# Patient Record
Sex: MALE | Race: BLACK OR AFRICAN AMERICAN
[De-identification: names, ages, dates, MRNs, and addresses within clinical notes are randomized per-mention and may not be internally consistent; named-entity substitution may affect disease eponyms.]

---

## 2018-01-09 ENCOUNTER — HOSPITAL ENCOUNTER (EMERGENCY)
Dept: HOSPITAL 89 - ER | Age: 33
Discharge: HOME | End: 2018-01-09
Payer: COMMERCIAL

## 2018-01-09 VITALS — BODY MASS INDEX: 38.3 KG/M2 | HEIGHT: 67 IN | WEIGHT: 244 LBS

## 2018-01-09 VITALS — SYSTOLIC BLOOD PRESSURE: 132 MMHG | DIASTOLIC BLOOD PRESSURE: 72 MMHG

## 2018-01-09 DIAGNOSIS — K52.9: Primary | ICD-10-CM

## 2018-01-09 LAB — PLATELET COUNT, AUTOMATED: 294 K/UL (ref 150–450)

## 2018-01-09 PROCEDURE — 96361 HYDRATE IV INFUSION ADD-ON: CPT

## 2018-01-09 PROCEDURE — 99284 EMERGENCY DEPT VISIT MOD MDM: CPT

## 2018-01-09 PROCEDURE — 82310 ASSAY OF CALCIUM: CPT

## 2018-01-09 PROCEDURE — 84075 ASSAY ALKALINE PHOSPHATASE: CPT

## 2018-01-09 PROCEDURE — 84295 ASSAY OF SERUM SODIUM: CPT

## 2018-01-09 PROCEDURE — 84155 ASSAY OF PROTEIN SERUM: CPT

## 2018-01-09 PROCEDURE — 87502 INFLUENZA DNA AMP PROBE: CPT

## 2018-01-09 PROCEDURE — 82565 ASSAY OF CREATININE: CPT

## 2018-01-09 PROCEDURE — 82435 ASSAY OF BLOOD CHLORIDE: CPT

## 2018-01-09 PROCEDURE — 96375 TX/PRO/DX INJ NEW DRUG ADDON: CPT

## 2018-01-09 PROCEDURE — 82947 ASSAY GLUCOSE BLOOD QUANT: CPT

## 2018-01-09 PROCEDURE — 82040 ASSAY OF SERUM ALBUMIN: CPT

## 2018-01-09 PROCEDURE — 81001 URINALYSIS AUTO W/SCOPE: CPT

## 2018-01-09 PROCEDURE — 82247 BILIRUBIN TOTAL: CPT

## 2018-01-09 PROCEDURE — 82374 ASSAY BLOOD CARBON DIOXIDE: CPT

## 2018-01-09 PROCEDURE — 84520 ASSAY OF UREA NITROGEN: CPT

## 2018-01-09 PROCEDURE — 84450 TRANSFERASE (AST) (SGOT): CPT

## 2018-01-09 PROCEDURE — 84460 ALANINE AMINO (ALT) (SGPT): CPT

## 2018-01-09 PROCEDURE — 85025 COMPLETE CBC W/AUTO DIFF WBC: CPT

## 2018-01-09 PROCEDURE — 96374 THER/PROPH/DIAG INJ IV PUSH: CPT

## 2018-01-09 PROCEDURE — 84132 ASSAY OF SERUM POTASSIUM: CPT

## 2018-01-09 NOTE — ER REPORT
History and Physical


Time Seen By MD:  19:04


Hx. of Stated Complaint:  


PATIENT HAS BEEN HAVING ABDOMINAL CRAMPING, DIARRHEA AND VOMITING SINCE 


YESTERDAY. PATIENT STATES HE FEELS LIKE HE IS "CRAPPING RAZOR BLADES". THERE IS 


BLOOD IN STOOL AND IT IS MUCOIDISH.


HPI/ROS


CHIEF COMPLAINT: Diarrhea





HISTORY OF PRESENT ILLNESS: This is a 32-year-old male who presents to the 

emergency department for diarrhea. Patient states that yesterday morning when 

he left [place] he developed some abdominal pain that progressed into diarrhea 

yesterday and even worse today he's had multiple episodes of diarrhea now with 

blood and discomfort in his rectum. Patient states that he has a jellylike 

discharge in the stool, no foul odor. Patient states that he's had bright red 

blood on the toilet paper with darker blood in the stool. No clots. He's had 

aches, chills, has felt feverish but no documented fevers. One episode of 

emesis with nausea. Patient denies urinary discomfort.





REVIEW OF SYSTEMS:


Constitutional: As above.


Eyes: No discharge.


ENT: No sore throat. 


Cardiovascular: No chest pain, no palpitations.


Respiratory: No cough, no shortness of breath.


Gastrointestinal: As above.


Genitourinary: No hematuria.


Musculoskeletal: No back pain.


Skin: No rashes.


Neurological: No headache.


Allergies:  


Coded Allergies:  


     morphine (Verified  Allergy, Intermediate, ITCHING/SKIN BURNING, 1/9/18)


Home Meds


Active Scripts


Tramadol Hcl (TRAMADOL HCL) 50 Mg Tablet,  MG PO Q4-6H, #10 TAB


   Prov:BEULAH ROBERSON FNP-BC         1/9/18


Reported Medications


Sertraline Hcl (ZOLOFT) 50 Mg Tablet, 1 TAB PO QDAY, TAB


   1/9/18


Divalproex Sodium (DEPAKOTE) 250 Mg Tablet.dr, 250 MG PO BID, TAB


   1/9/18


Past Medical/Surgical History


Patient has a past medical and surgical history of seizures, migraines, 

hypertension, hypercholesterolemia, glasses, bipolar, depression, lumbar fusion 

with titanium rods


Reviewed Nurses Notes:  Yes


Hx Substance Use Disorder:  No


Hx Alcohol Use:  Yes (OCCASSIONAL)


Constitutional





Vital Sign - Last 24 Hours








 1/9/18 1/9/18 1/9/18 1/9/18





 18:55 19:00 19:03 19:18


 


Temp 98.2   


 


Pulse 98  94 98


 


Resp 24   


 


B/P (MAP) 154/92 152/87 (108)  


 


Pulse Ox 92  92 95


 


O2 Delivery Room Air   


 


    





 1/9/18 1/9/18 1/9/18 1/9/18





 19:30 19:33 19:48 20:00


 


Pulse  94 88 


 


B/P (MAP) 135/80 (98)   135/74 (94)


 


Pulse Ox  94 92 





 1/9/18 1/9/18 1/9/18 1/9/18





 20:03 20:18 20:23 20:30


 


Pulse 89 92 90 


 


B/P (MAP)    132/72 (92)


 


Pulse Ox 93 91 91 





 1/9/18 1/9/18  





 20:38 20:43  


 


Pulse 89 96  


 


Pulse Ox 92 91  














Intake and Output   


 


 1/9/18 1/9/18 1/10/18





 15:00 23:00 07:00


 


Intake Total  1000 ml 


 


Balance  1000 ml 








Physical Exam


   General Appearance: The patient is alert, has no immediate need for airway 

protection and no signs of toxicity. 


Eyes: Pupils equal and round no pallor or injection.


ENT, Mouth: Mucous membranes are moist. Mild erythema to the posterior 

oropharynx, no tonsillar hypertrophy.


Respiratory: There are no retractions, lungs are clear to auscultation.


Cardiovascular: Regular rate and rhythm, no murmurs, clicks or rubs.


Gastrointestinal:  Abdomen is soft with tenderness to bilateral lower quadrants 

as well as left upper with palpation, no masses, hyperactive bowel sounds.


Neurological: Alert and oriented 4. Moving all extremity. No focal neuro 

deficits. Following all commands.


Skin: Warm and dry, no rashes.


Musculoskeletal:  Neck is supple non tender.


      Extremities are nontender, nonswollen and have full range of motion.





DIFFERENTIAL DIAGNOSIS: After history and physical exam differential diagnosis 

was considered for abdominal pain including but not limited to appendicitis, 

cholecystitis, gastritis and urinary tract infection.





Medical Decision Making


Data Points


Result Diagram:  


1/9/18 1921 1/9/18 1921





Laboratory





Hematology








Test


  1/9/18


19:21


 


Red Blood Count


  5.61 M/uL


(4.00-5.60)


 


Mean Corpuscular Volume


  89.4 fL


(80.0-96.0)


 


Mean Corpuscular Hemoglobin


  30.0 pg


(26.0-33.0)


 


Mean Corpuscular Hemoglobin


Concent 33.5 g/dL


(32.0-36.0)


 


Red Cell Distribution Width


  13.4 %


(11.5-14.5)


 


Mean Platelet Volume


  9.0 fL


(7.2-11.1)


 


Neutrophils (%) (Auto)


  63.3 %


(39.4-72.5)


 


Lymphocytes (%) (Auto)


  28.0 %


(17.6-49.6)


 


Monocytes (%) (Auto)


  4.4 %


(4.1-12.4)


 


Eosinophils (%) (Auto)


  3.3 %


(0.4-6.7)


 


Basophils (%) (Auto)


  1.0 %


(0.3-1.4)


 


Nucleated RBC Relative Count


(auto) 0.1 /100WBC 


 


 


Neutrophils # (Auto)


  7.8 K/uL


(2.0-7.4)


 


Lymphocytes # (Auto)


  3.4 K/uL


(1.3-3.6)


 


Monocytes # (Auto)


  0.5 K/uL


(0.3-1.0)


 


Eosinophils # (Auto)


  0.4 K/uL


(0.0-0.5)


 


Basophils # (Auto)


  0.1 K/uL


(0.0-0.1)


 


Nucleated RBC Absolute Count


(auto) 0.01 K/uL 


 


 


Urine Color Yellow 


 


Urine Clarity Clear 


 


Urine pH


  5.0 pH


(4.8-9.5)


 


Urine Specific Gravity 1.021 


 


Urine Protein


  Negative mg/dL


(NEGATIVE)


 


Urine Glucose (UA)


  Negative mg/dL


(NEGATIVE)


 


Urine Ketones


  Negative mg/dL


(NEGATIVE)


 


Urine Blood


  Small


(NEGATIVE)


 


Urine Nitrite


  Negative


(NEGATIVE)


 


Urine Bilirubin


  Negative


(NEGATIVE)


 


Urine Urobilinogen


  Negative mg/dL


(0.2-1.9)


 


Urine Leukocyte Esterase


  Negative


(NEGATIVE)


 


Urine RBC


  <1 /HPF


(0-2/HPF)


 


Urine WBC


  <1 /HPF


(0-5/HPF)


 


Urine Squamous Epithelial


Cells None /LPF


(</=FEW)


 


Urine Bacteria


  Negative /HPF


(NONE-FEW)


 


Urine Mucus


  None /HPF


(NONE-FEW)


 


Sodium Level


  138 mmol/L


(137-145)


 


Potassium Level


  3.8 mmol/L


(3.5-5.0)


 


Chloride Level


  101 mmol/L


()


 


Carbon Dioxide Level


  26 mmol/L


(22-30)


 


Blood Urea Nitrogen


  11 mg/dl


(9-21)


 


Creatinine


  0.90 mg/dl


(0.66-1.25)


 


Glomerular Filtration Rate


Calc > 60.0 


 


 


Random Glucose


  107 mg/dl


()


 


Calcium Level


  9.2 mg/dl


(8.4-10.2)


 


Total Bilirubin


  0.3 mg/dl


(0.2-1.3)


 


Aspartate Amino Transf


(AST/SGOT) 25 U/L (0-35) 


 


 


Alanine Aminotransferase


(ALT/SGPT) 35 U/L (0-56) 


 


 


Alkaline Phosphatase


  108 U/L


(0-126)


 


Total Protein


  7.9 gm/dl


(6.3-8.2)


 


Albumin


  4.3 g/dl


(3.5-5.0)


 


Influenza Type A Antigen


  Negative


(NEGATIVE)


 


Influenza Type B Antigen


  Negative


(NEGATIVE)








Chemistry








Test


  1/9/18


19:21


 


White Blood Count


  12.3 k/uL


(4.5-11.0)


 


Red Blood Count


  5.61 M/uL


(4.00-5.60)


 


Hemoglobin


  16.8 g/dL


(14.0-18.0)


 


Hematocrit


  50.1 %


(42.0-52.0)


 


Mean Corpuscular Volume


  89.4 fL


(80.0-96.0)


 


Mean Corpuscular Hemoglobin


  30.0 pg


(26.0-33.0)


 


Mean Corpuscular Hemoglobin


Concent 33.5 g/dL


(32.0-36.0)


 


Red Cell Distribution Width


  13.4 %


(11.5-14.5)


 


Platelet Count


  294 K/uL


(150-450)


 


Mean Platelet Volume


  9.0 fL


(7.2-11.1)


 


Neutrophils (%) (Auto)


  63.3 %


(39.4-72.5)


 


Lymphocytes (%) (Auto)


  28.0 %


(17.6-49.6)


 


Monocytes (%) (Auto)


  4.4 %


(4.1-12.4)


 


Eosinophils (%) (Auto)


  3.3 %


(0.4-6.7)


 


Basophils (%) (Auto)


  1.0 %


(0.3-1.4)


 


Nucleated RBC Relative Count


(auto) 0.1 /100WBC 


 


 


Neutrophils # (Auto)


  7.8 K/uL


(2.0-7.4)


 


Lymphocytes # (Auto)


  3.4 K/uL


(1.3-3.6)


 


Monocytes # (Auto)


  0.5 K/uL


(0.3-1.0)


 


Eosinophils # (Auto)


  0.4 K/uL


(0.0-0.5)


 


Basophils # (Auto)


  0.1 K/uL


(0.0-0.1)


 


Nucleated RBC Absolute Count


(auto) 0.01 K/uL 


 


 


Urine Color Yellow 


 


Urine Clarity Clear 


 


Urine pH


  5.0 pH


(4.8-9.5)


 


Urine Specific Gravity 1.021 


 


Urine Protein


  Negative mg/dL


(NEGATIVE)


 


Urine Glucose (UA)


  Negative mg/dL


(NEGATIVE)


 


Urine Ketones


  Negative mg/dL


(NEGATIVE)


 


Urine Blood


  Small


(NEGATIVE)


 


Urine Nitrite


  Negative


(NEGATIVE)


 


Urine Bilirubin


  Negative


(NEGATIVE)


 


Urine Urobilinogen


  Negative mg/dL


(0.2-1.9)


 


Urine Leukocyte Esterase


  Negative


(NEGATIVE)


 


Urine RBC


  <1 /HPF


(0-2/HPF)


 


Urine WBC


  <1 /HPF


(0-5/HPF)


 


Urine Squamous Epithelial


Cells None /LPF


(</=FEW)


 


Urine Bacteria


  Negative /HPF


(NONE-FEW)


 


Urine Mucus


  None /HPF


(NONE-FEW)


 


Glomerular Filtration Rate


Calc > 60.0 


 


 


Calcium Level


  9.2 mg/dl


(8.4-10.2)


 


Total Bilirubin


  0.3 mg/dl


(0.2-1.3)


 


Aspartate Amino Transf


(AST/SGOT) 25 U/L (0-35) 


 


 


Alanine Aminotransferase


(ALT/SGPT) 35 U/L (0-56) 


 


 


Alkaline Phosphatase


  108 U/L


(0-126)


 


Total Protein


  7.9 gm/dl


(6.3-8.2)


 


Albumin


  4.3 g/dl


(3.5-5.0)


 


Influenza Type A Antigen


  Negative


(NEGATIVE)


 


Influenza Type B Antigen


  Negative


(NEGATIVE)








Urinalysis








Test


  1/9/18


19:21


 


Urine Color Yellow 


 


Urine Clarity Clear 


 


Urine pH


  5.0 pH


(4.8-9.5)


 


Urine Specific Gravity 1.021 


 


Urine Protein


  Negative mg/dL


(NEGATIVE)


 


Urine Glucose (UA)


  Negative mg/dL


(NEGATIVE)


 


Urine Ketones


  Negative mg/dL


(NEGATIVE)


 


Urine Blood


  Small


(NEGATIVE)


 


Urine Nitrite


  Negative


(NEGATIVE)


 


Urine Bilirubin


  Negative


(NEGATIVE)


 


Urine Urobilinogen


  Negative mg/dL


(0.2-1.9)


 


Urine Leukocyte Esterase


  Negative


(NEGATIVE)


 


Urine RBC


  <1 /HPF


(0-2/HPF)


 


Urine WBC


  <1 /HPF


(0-5/HPF)


 


Urine Squamous Epithelial


Cells None /LPF


(</=FEW)


 


Urine Bacteria


  Negative /HPF


(NONE-FEW)


 


Urine Mucus


  None /HPF


(NONE-FEW)











ED Course/Re-evaluation


Clinical Indication for ER IV:  Hydration, IV Access


ED Course


The patient was admitted to a room. A history and physical were obtained. 

Differential diagnoses were considered. A CBC, CMP and UA were obtained. Lab 

studies were unremarkable, except mild elevation in WBC's, 12.3. The patient 

was given a 1 L normal saline bolus 30 mg IV Toradol. 1000 mg Tylenol. I did 

review the lab studies with the patient. I did tell him this is likely a 

gastroenteritis and will likely run its course however if his diarrhea begins 

to increase as well as the blood that he should follow-up in the nearest 

emergency department or clinic as he is traveling for work. Patient states he 

is feeling better at this time, tired and is ready to go. I did give the 

patient tramadol for his discomfort as well as a prescription for tramadol. I 

also encouraged him to follow-up with his primary care provider when he returns 

to [place]. The patient has not questions or concerns at this time and was 

discharged home.


Decision to Disposition Date:  Jan 9, 2018


Decision to Disposition Time:  20:48





Depart


Departure


Latest Vital Signs





Vital Signs








  Date Time  Temp Pulse Resp B/P (MAP) Pulse Ox O2 Delivery O2 Flow Rate FiO2


 


1/9/18 20:43  96   91   


 


1/9/18 20:30    132/72 (92)    


 


1/9/18 18:55 98.2  24   Room Air  








Impression:  


 Primary Impression:  


 Gastroenteritis


Condition:  Improved


Disposition:  HOME OR SELF-CARE


New Scripts


Tramadol Hcl (TRAMADOL HCL) 50 Mg Tablet


 MG PO Q4-6H, #10 TAB


   Prov: BEULAH ROBERSON ABEBA FNP-BC         1/9/18


Patient Instructions:  Gastroenteritis (ED)





Additional Instructions:  


Drink plenty of fluids.


Try a clear liquid diet for the next 24 hours.


Slowly progress into a regular diet.


Take the tramadol for severe pain.


Can take ibuprofen or Tylenol as needed for discomfort.


As you are traveling around for work and if your symptoms become worse please 

follow-up with the nearest emergency department or clinic.


When you return to [place] please follow-up with your primary care provider.


May return to the [facility] ED for worsening symptoms.


NP/PA consult with MD:  Verbally


MD Consult Note:


BEULAH Morales-BC Jan 9, 2018 19:04